# Patient Record
Sex: MALE | Race: WHITE | Employment: FULL TIME | URBAN - METROPOLITAN AREA
[De-identification: names, ages, dates, MRNs, and addresses within clinical notes are randomized per-mention and may not be internally consistent; named-entity substitution may affect disease eponyms.]

---

## 2021-03-15 ENCOUNTER — OFFICE VISIT (OUTPATIENT)
Dept: ORTHOPEDIC SURGERY | Age: 31
End: 2021-03-15
Payer: COMMERCIAL

## 2021-03-15 VITALS
SYSTOLIC BLOOD PRESSURE: 132 MMHG | DIASTOLIC BLOOD PRESSURE: 84 MMHG | HEART RATE: 98 BPM | OXYGEN SATURATION: 98 % | HEIGHT: 74 IN | BODY MASS INDEX: 23.61 KG/M2 | WEIGHT: 184 LBS | TEMPERATURE: 98.1 F

## 2021-03-15 DIAGNOSIS — M54.41 CHRONIC BILATERAL LOW BACK PAIN WITH BILATERAL SCIATICA: Primary | ICD-10-CM

## 2021-03-15 DIAGNOSIS — M54.42 CHRONIC BILATERAL LOW BACK PAIN WITH BILATERAL SCIATICA: Primary | ICD-10-CM

## 2021-03-15 DIAGNOSIS — G89.29 CHRONIC BILATERAL LOW BACK PAIN WITH BILATERAL SCIATICA: ICD-10-CM

## 2021-03-15 DIAGNOSIS — M62.561 ATROPHY OF MUSCLE OF RIGHT LOWER LEG: ICD-10-CM

## 2021-03-15 DIAGNOSIS — M21.372 FOOT DROP, BILATERAL: ICD-10-CM

## 2021-03-15 DIAGNOSIS — M54.42 CHRONIC BILATERAL LOW BACK PAIN WITH BILATERAL SCIATICA: ICD-10-CM

## 2021-03-15 DIAGNOSIS — G89.29 CHRONIC BILATERAL LOW BACK PAIN WITH BILATERAL SCIATICA: Primary | ICD-10-CM

## 2021-03-15 DIAGNOSIS — M54.41 CHRONIC BILATERAL LOW BACK PAIN WITH BILATERAL SCIATICA: ICD-10-CM

## 2021-03-15 DIAGNOSIS — M21.371 FOOT DROP, BILATERAL: ICD-10-CM

## 2021-03-15 DIAGNOSIS — R29.898 BILATERAL LEG WEAKNESS: ICD-10-CM

## 2021-03-15 PROCEDURE — 99203 OFFICE O/P NEW LOW 30 MIN: CPT | Performed by: NURSE PRACTITIONER

## 2021-03-15 PROCEDURE — 72100 X-RAY EXAM L-S SPINE 2/3 VWS: CPT | Performed by: NURSE PRACTITIONER

## 2021-03-15 RX ORDER — CHOLECALCIFEROL (VITAMIN D3) 125 MCG
660 CAPSULE ORAL DAILY
COMMUNITY

## 2021-03-15 NOTE — LETTER
3/15/2021 3:34 PM 
 
Mr. Jung Murphy 57 [address] [address] . Etta Isaac was seen in our office today for his spine condition. He has been unable to work since March 10, 2021 and will be unable to return to work until after his follow up appointment. The follow up 
 
appointment will be determined once he has had his lumbar MRI. Sincerely, Chasity Corrales NP

## 2021-03-15 NOTE — PROGRESS NOTES
Chief complaint   Chief Complaint   Patient presents with    Back Pain    Leg Pain     has had N/T in R > L in time       History of Present Illness:  Pearl Guthrie is a  35 y.o.  male comes in today on self-referral as a new patient. He states that over a year ago he threw out his back by throwing a box and immediately had right leg pins-and-needles that lasted for 5 to 6 months. He went to a chiropractor who tried to do some adjustments and did some x-rays but he does not really know what the x-ray said. He then went to a physician who put him in physical therapy but his pain was so bad he could only tolerate the one session. He has been to the ER on July 10 of 2019 for back pain so I think this muscle started more along that timeframe. He states the pain is a sharp shooting pain that goes into his bilateral legs down to his feet. It is in his bilateral buttocks and travels posterior lateral down to his feet. He states his legs are pretty much totally numb. And he has noticed over the last several months he now has atrophy in his right leg. He is also had a 70 pound weight loss over the last couple years which is unexplained. He states the right foot drop started 6 to 7 months ago and then the left one followed after that. He works as a  at the [employer]. He dips snuff using 1 can/day. He does smoke marijuana at bedtime due to his pain. Rates his pain is a 6 out of 10. He states he has difficulty walking because both his feet will slap as he walks. He has to concentrate very hard to even get his feet to lift up enough to walk. His pain is increased with sitting, standing, walking, changing positions, lifting and bending. It is decreased with laying. He states he can only lay on his left side because if he lays on the right the pain is unbearable. He denies fever. He states he has a hard time initiating urine and bowel movements.   He states once he is able to get going that it does come out okay. Patient reports that he has not been able to work since last Wednesday due to his pain. Past Medical History: Bipolar, ADHD    Past Surgical History: Right hand surgery x2      Physical Exam: The patient is a 25-year-old male well-developed well-nourished who is alert and oriented with a normal mood and affect. He is 6 foot 2 and weighs 184 pounds. He has a full weightbearing off balance gait with flat feet. He has 1 out of 5 strength of his bilateral EHL and tib ants. He has 3-4 out of 5 strength of his bilateral hamstrings and quads. He is unable to get up on his toes or his heels bilaterally he has normal reflexes at the patella and 0 reflexes at the Achilles. Negative clonus. His right calf muscle measures 34-1/2 cm. The left calf muscle measures 37-1/2 cm. He has pain with hyper tension of the lumbar spine. Assessment and Plan: This is a patient has back pain with bilateral leg pain and neurological deficits. We did a lumbar AP and lateral x-ray. We will get a urgent MRI done of his lumbar spine. He will follow-up with Dr. Josie Freeman. I will put him out of work until he is seen for the MRI follow-up.         Review of systems:    Past Medical History:   Diagnosis Date    Bipolar disorder [facility])      Past Surgical History:   Procedure Laterality Date    HX OTHER SURGICAL      right hand thumb and hand     Social History     Socioeconomic History    Marital status:      Spouse name: Not on file    Number of children: Not on file    Years of education: Not on file    Highest education level: Not on file   Occupational History    Not on file   Social Needs    Financial resource strain: Not on file    Food insecurity     Worry: Not on file     Inability: Not on file    Transportation needs     Medical: Not on file     Non-medical: Not on file   Tobacco Use    Smoking status: Former Smoker     Packs/day: 0.50     Types: Cigarettes    Smokeless tobacco: Current User     Types: Chew   Substance and Sexual Activity    Alcohol use: Not Currently    Drug use: Yes     Types: Marijuana     Comment: daily    Sexual activity: Not Currently   Lifestyle    Physical activity     Days per week: Not on file     Minutes per session: Not on file    Stress: Not on file   Relationships    Social connections     Talks on phone: Not on file     Gets together: Not on file     Attends [employer] service: Not on file     Active member of club or organization: Not on file     Attends meetings of clubs or organizations: Not on file     Relationship status: Not on file    Intimate partner violence     Fear of current or ex partner: Not on file     Emotionally abused: Not on file     Physically abused: Not on file     Forced sexual activity: Not on file   Other Topics Concern    Not on file   Social History Narrative    Not on file     Family History   Problem Relation Age of Onset    Heart Disease Maternal Grandfather        Physical Exam:  Visit Vitals  /84 (BP 1 Location: Left upper arm, BP Patient Position: Sitting, BP Cuff Size: Adult long)   Pulse 98   Temp 98.1 °F (36.7 °C) (Temporal)   Ht 6' 2\" (1.88 m)   Wt 184 lb (83.5 kg)   SpO2 98%   BMI 23.62 kg/m²     Pain Scale: 6/10    LPMP has been . reviewed and is appropriate        Diagnoses and all orders for this visit:    1. Chronic bilateral low back pain with bilateral sciatica  -     AMB POC XRAY, SPINE, LUMBOSACRAL; 2 O  -     MRI LUMB SPINE WO CONT; Future    2. Bilateral leg weakness  -     MRI LUMB SPINE WO CONT; Future    3. Foot drop, bilateral  -     MRI LUMB SPINE WO CONT; Future    4. Atrophy of muscle of right lower leg  -     MRI LUMB SPINE WO CONT; Future            Follow-up and Dispositions    · Return in about 1 week (around 3/22/2021) for with Dr Cristina Diego for MRI f/u.              We have informed Bert You to notify us for immediate appointment if he has any worsening neurogical symptoms or if an emergency situation presents, then call [phone]

## 2021-03-22 ENCOUNTER — OFFICE VISIT (OUTPATIENT)
Dept: ORTHOPEDIC SURGERY | Age: 31
End: 2021-03-22
Payer: COMMERCIAL

## 2021-03-22 VITALS
TEMPERATURE: 98.5 F | OXYGEN SATURATION: 99 % | SYSTOLIC BLOOD PRESSURE: 143 MMHG | DIASTOLIC BLOOD PRESSURE: 88 MMHG | WEIGHT: 185 LBS | HEART RATE: 111 BPM | BODY MASS INDEX: 23.75 KG/M2

## 2021-03-22 DIAGNOSIS — M54.42 CHRONIC BILATERAL LOW BACK PAIN WITH BILATERAL SCIATICA: ICD-10-CM

## 2021-03-22 DIAGNOSIS — G89.29 CHRONIC BILATERAL LOW BACK PAIN WITH BILATERAL SCIATICA: ICD-10-CM

## 2021-03-22 DIAGNOSIS — M25.552 BILATERAL HIP PAIN: ICD-10-CM

## 2021-03-22 DIAGNOSIS — M54.16 LEFT LUMBAR RADICULOPATHY: ICD-10-CM

## 2021-03-22 DIAGNOSIS — M54.41 CHRONIC BILATERAL LOW BACK PAIN WITH BILATERAL SCIATICA: ICD-10-CM

## 2021-03-22 DIAGNOSIS — M25.551 BILATERAL HIP PAIN: ICD-10-CM

## 2021-03-22 DIAGNOSIS — M48.061 SPINAL STENOSIS AT L4-L5 LEVEL: ICD-10-CM

## 2021-03-22 DIAGNOSIS — M21.371 FOOT DROP, BILATERAL: Primary | ICD-10-CM

## 2021-03-22 DIAGNOSIS — M21.372 FOOT DROP, BILATERAL: Primary | ICD-10-CM

## 2021-03-22 PROCEDURE — 99214 OFFICE O/P EST MOD 30 MIN: CPT | Performed by: PHYSICAL MEDICINE & REHABILITATION

## 2021-03-22 NOTE — PROGRESS NOTES
[address]  [address], [address]  [address]  Phone: [phone]  Fax: [phone]      Patient: Max Alvarez                                                                              MRN: [id number]        YOB: 1990          AGE: 35 y.o. PCP: None  Date:  03/22/21    Reason for Consultation: Back Pain      HPI:  Max Alvarez is a 35 y.o. male  who presents with back pain. Pain has been present for many years and he has had several episodes for severe pain. About 1 year ago when lifting and throwing a box he felt his entire right leg go numb and developed weakness in his right foot. He continued to work despite pain in his low back and weakness in his right leg. He saw a chiropractor at the time and did some adjustments. He also tried one sessions of PT which he found very painful and could not tolerate. About 2 weeks ago while walking he felt numbness and tingling down his left leg and developed a left foot drop. He was seen last week by Jeffrey Christianson NP who got an MRI of his lumbar spine, and he is here to review findings. MRI demonstrates L4/5 left paracentral disc bulge with extruded material severe central and left lateral recess stenosis. L5/S1 right paracentral disc bulge severe b/l foraminal stenosis, L2/3 moderate central and severe left latarl recess stenosis    . Neurologic symptoms:++ numbness, tingling, ++weakness. Trouble initiating bowel movement. No incontinence bowel or bladder changes. No recent falls but unsteady gait    Location: The pain is located in the low back/hips bilateral legs  Radiation: The pain does radiate bilateral legs. Pain Score: Currently: 7/10   Quality: Pain is of a Stabbing, Tingling and Numbness quality. Aggravating: Pain is exacerbated by working, standing  Alleviating:  The pain is alleviated by lying down on right side pillow between legs    Prior Treatments: chiropractic treatments 1 year ago  Previous Medications:  Aleve  Current Medications:   Previous work-up has included:     Past Medical History:   Past Medical History:   Diagnosis Date    Bipolar disorder ([address]       Past Surgical History:   Past Surgical History:   Procedure Laterality Date    HX OTHER SURGICAL      right hand thumb and hand      SocHx:   Social History     Tobacco Use    Smoking status: Former Smoker     Packs/day: 0.50     Types: Cigarettes    Smokeless tobacco: Current User     Types: Chew   Substance Use Topics    Alcohol use: Not Currently      FamHx:? Family History   Problem Relation Age of Onset    Heart Disease Maternal Grandfather        Current Medications:    Current Outpatient Medications   Medication Sig Dispense Refill    naproxen sodium (Aleve) 220 mg cap Take 660 mg by mouth daily.  Lisdexamfetamine (Vyvanse) 70 mg cap Take 70 mg by mouth daily.  ARIPiprazole (ABILIFY) 5 mg tablet Take 5 mg by mouth daily. Allergies:  No Known Allergies     Review of Systems:   Gen:    Denied fevers, chills, malaise, fatigue, weight changes   Resp: Denied shortness of breath, cough, wheezing   CVS: Denied chest pain, palpitations   : Denied urinary urgency, frequency, incontinence   GI: Denied nausea, vomiting, constipation, diarrhea   Skin: Denied rashes, wounds   Psych: Denied anxiety, depression   Vasc: Denied claudication, ulcers   Hem: Denied easy bruising/bleeding   MSK: See HPI   Neuro: See HPI         Physical Exam     Vital Signs:   Visit Vitals  BP (!) 143/88 (BP 1 Location: Left arm, BP Patient Position: Sitting)   Pulse (!) 111   Temp 98.5 °F (36.9 °C) (Skin)   Wt 185 lb (83.9 kg)   SpO2 99%   BMI 23.75 kg/m²      General: ??????? Well nourished and well developed male without any acute distress   Psychiatric: ?  Alert and oriented x 3 with normal mood    HEENT: ????????   Atraumatic   Respiratory:   Breathing non-labored and non dyspneic   CV: ???????????????? Peripheral pulses intact, no peripheral edema   Skin: ????????????? No rashes       Neurologic: ?? Sensation: normal and grossly intact thebilateral, lower extremity(s) except diminished b/l lower extremities  Strength: 5/5 in the bilateral, lower extremity(s) except bilateral hip flexion 4/5, knee extension 4/5, right DF 2/5, EHL 2/5 , PF unable to calf raise ,  Left DF 3/5, EHL3/5, PF 3/5   Full strength and sensation b/l Upper extremities  Reflexes: reveals 2+ symmetric DTRs throughout except right plantar reflex absent  Gait: abnormal- external rotation right hip, circumducts  Upper tract signs: Babinski down going, Ching's negative ?, no clonus  Atrophy left leg     Lumbar spine ROM- pain with flexion and extension, worse with extension  Pain with hip ROM b/l- worse on the right  + right log roll, +b /l FADIR  + seated slump and straight leg b/l    Medical Decision Making:    Images:   MRI lumbar spine  MRI demonstrates L4/5 left paracentral disc bulge with extruded material severe central and left lateral recess stenosis. L5/S1 right paracentral disc bulge severe b/l foraminal stenosis, L2/3 moderate central and severe left lateral recess stenosis       Assessment:   1. Acute Left L5 radiculopathy with foot drop  2. Severe spinal stenosis L4/5  3. Chronic right L5/S1 radiculopathy- with weakness plantar flexion and dorisflexion  Plan:      -Referral to Dr. Margret Roberts- for surgical evaluation given new left foot weakness and evidence of severe central stenosis and left lateral recess stenosis on MRI.   He does also have chronic right foot weakness and explained this strength unfortunately may not improve.   -Consider EMG  - x-ray b/l hips-decreased ROM on exam and pain with internal rotation  -Consider right AFO  _Note provided to hold from work  _F/u if needed after evaluation with Dr. Millicent Couch [facility]

## 2021-03-22 NOTE — LETTER
3/22/2021 9:22 AM 
 
Mr. Jung Chao [facility] [address] [address] To whom it may concern, Jung Iyer is currently under my care. I recommend that he rest from work over the next two weeks while he undergoes work up and further evaluation of his condition. Please contact me with any questions. Sincerely, Fabian Ruiz MD

## 2021-04-05 PROBLEM — R59.1 LYMPHADENOPATHY: Status: ACTIVE | Noted: 2021-04-05

## 2021-04-05 PROBLEM — J98.59 MEDIASTINAL MASS: Status: ACTIVE | Noted: 2021-04-05

## 2021-04-06 ENCOUNTER — OFFICE VISIT (OUTPATIENT)
Age: 31
End: 2021-04-06
Payer: COMMERCIAL

## 2021-04-06 ENCOUNTER — NURSE NAVIGATOR (OUTPATIENT)
Dept: OTHER | Age: 31
End: 2021-04-06

## 2021-04-06 ENCOUNTER — HOSPITAL ENCOUNTER (OUTPATIENT)
Dept: INFUSION THERAPY | Age: 31
Discharge: HOME | End: 2021-04-06
Payer: COMMERCIAL

## 2021-04-06 ENCOUNTER — TRANSCRIBE ORDER (OUTPATIENT)
Dept: SCHEDULING | Age: 31
End: 2021-04-06

## 2021-04-06 VITALS
TEMPERATURE: 97 F | OXYGEN SATURATION: 96 % | DIASTOLIC BLOOD PRESSURE: 83 MMHG | RESPIRATION RATE: 16 BRPM | HEART RATE: 97 BPM | SYSTOLIC BLOOD PRESSURE: 126 MMHG

## 2021-04-06 VITALS
HEART RATE: 97 BPM | SYSTOLIC BLOOD PRESSURE: 126 MMHG | WEIGHT: 188 LBS | BODY MASS INDEX: 24.13 KG/M2 | TEMPERATURE: 97 F | HEIGHT: 74 IN | OXYGEN SATURATION: 96 % | DIASTOLIC BLOOD PRESSURE: 83 MMHG | RESPIRATION RATE: 16 BRPM

## 2021-04-06 DIAGNOSIS — J98.59 DISEASE OF MEDIASTINUM: Primary | ICD-10-CM

## 2021-04-06 DIAGNOSIS — J98.59 MEDIASTINAL MASS: Primary | ICD-10-CM

## 2021-04-06 DIAGNOSIS — J98.59 MEDIASTINAL MASS: ICD-10-CM

## 2021-04-06 DIAGNOSIS — R59.1 LYMPHADENOPATHY: ICD-10-CM

## 2021-04-06 LAB
ALBUMIN SERPL-MCNC: 4.1 G/DL (ref 3.4–5)
ALBUMIN/GLOB SERPL: 1.2 {RATIO} (ref 0.8–1.7)
ALP SERPL-CCNC: 97 U/L (ref 45–117)
ALT SERPL-CCNC: 38 U/L (ref 16–61)
ANION GAP SERPL CALC-SCNC: 2 MMOL/L (ref 3–18)
AST SERPL-CCNC: 20 U/L (ref 10–38)
BASO+EOS+MONOS # BLD AUTO: 0.7 K/UL (ref 0–2.3)
BASO+EOS+MONOS NFR BLD AUTO: 11 % (ref 0.1–17)
BILIRUB SERPL-MCNC: 0.3 MG/DL (ref 0.2–1)
BUN SERPL-MCNC: 13 MG/DL (ref 7–18)
BUN/CREAT SERPL: 13 (ref 12–20)
CALCIUM SERPL-MCNC: 8.8 MG/DL (ref 8.5–10.1)
CHLORIDE SERPL-SCNC: 107 MMOL/L (ref 100–111)
CO2 SERPL-SCNC: 30 MMOL/L (ref 21–32)
CREAT SERPL-MCNC: 1.04 MG/DL (ref 0.6–1.3)
CRP SERPL-MCNC: 2 MG/DL (ref 0–0.3)
DIFFERENTIAL METHOD BLD: NORMAL
ERYTHROCYTE [DISTWIDTH] IN BLOOD BY AUTOMATED COUNT: 12.9 % (ref 11.5–14.5)
ERYTHROCYTE [SEDIMENTATION RATE] IN BLOOD: 14 MM/HR (ref 0–20)
GLOBULIN SER CALC-MCNC: 3.4 G/DL (ref 2–4)
GLUCOSE SERPL-MCNC: 75 MG/DL (ref 74–99)
HAV IGM SER QL: NEGATIVE
HBV CORE IGM SER QL: NEGATIVE
HBV SURFACE AG SER QL: <0.1 INDEX
HBV SURFACE AG SER QL: NEGATIVE
HCT VFR BLD AUTO: 37.5 % (ref 36–48)
HCV AB SER IA-ACNC: 0.05 INDEX
HCV AB SERPL QL IA: NEGATIVE
HCV COMMENT,HCGAC: NORMAL
HGB BLD-MCNC: 12.9 G/DL (ref 12–16)
LDH SERPL L TO P-CCNC: 214 U/L (ref 81–234)
LYMPHOCYTES # BLD: 1.4 K/UL (ref 1.1–5.9)
LYMPHOCYTES NFR BLD: 24 % (ref 14–44)
MCH RBC QN AUTO: 31.2 PG (ref 25–35)
MCHC RBC AUTO-ENTMCNC: 34.4 G/DL (ref 31–37)
MCV RBC AUTO: 90.8 FL (ref 78–102)
NEUTS SEG # BLD: 4 K/UL (ref 1.8–9.5)
NEUTS SEG NFR BLD: 65 % (ref 40–70)
PLATELET # BLD AUTO: 176 K/UL (ref 140–440)
POTASSIUM SERPL-SCNC: 4.4 MMOL/L (ref 3.5–5.5)
PROT SERPL-MCNC: 7.5 G/DL (ref 6.4–8.2)
RBC # BLD AUTO: 4.13 M/UL (ref 4.1–5.1)
SODIUM SERPL-SCNC: 139 MMOL/L (ref 136–145)
SP1: NORMAL
SP2: NORMAL
SP3: NORMAL
URATE SERPL-MCNC: 6.2 MG/DL (ref 2.6–7.2)
WBC # BLD AUTO: 6.1 K/UL (ref 4.5–13)

## 2021-04-06 PROCEDURE — 83615 LACTATE (LD) (LDH) ENZYME: CPT

## 2021-04-06 PROCEDURE — 80074 ACUTE HEPATITIS PANEL: CPT

## 2021-04-06 PROCEDURE — 85025 COMPLETE CBC W/AUTO DIFF WBC: CPT

## 2021-04-06 PROCEDURE — 85652 RBC SED RATE AUTOMATED: CPT

## 2021-04-06 PROCEDURE — 87389 HIV-1 AG W/HIV-1&-2 AB AG IA: CPT

## 2021-04-06 PROCEDURE — 82164 ANGIOTENSIN I ENZYME TEST: CPT

## 2021-04-06 PROCEDURE — 84550 ASSAY OF BLOOD/URIC ACID: CPT

## 2021-04-06 PROCEDURE — 80053 COMPREHEN METABOLIC PANEL: CPT

## 2021-04-06 PROCEDURE — 86140 C-REACTIVE PROTEIN: CPT

## 2021-04-06 PROCEDURE — 86704 HEP B CORE ANTIBODY TOTAL: CPT

## 2021-04-06 PROCEDURE — 36415 COLL VENOUS BLD VENIPUNCTURE: CPT

## 2021-04-06 PROCEDURE — 86665 EPSTEIN-BARR CAPSID VCA: CPT

## 2021-04-06 PROCEDURE — 99204 OFFICE O/P NEW MOD 45 MIN: CPT | Performed by: INTERNAL MEDICINE

## 2021-04-06 PROCEDURE — 86038 ANTINUCLEAR ANTIBODIES: CPT

## 2021-04-06 NOTE — PROGRESS NOTES
[facility] Progress Note    Date: 2021    Name: Luna Rubinstein    MRN: [id number]         : 1990    Peripheral Lab Draw      Mr. Mia Arizmendi to [facility], ambulatory at [phone] accompanied by self. Pt was assessed and education was provided. Mr. Alpa Molina vitals were reviewed and patient was observed for 5 minutes prior to treatment. Visit Vitals  /83   Pulse 97   Temp 97 °F (36.1 °C) (Oral)   Resp 16   SpO2 96%     Recent Results (from the past 12 hour(s))   METABOLIC PANEL, COMPREHENSIVE    Collection Time: 21 11:02 AM   Result Value Ref Range    Sodium 139 136 - 145 mmol/L    Potassium 4.4 3.5 - 5.5 mmol/L    Chloride 107 100 - 111 mmol/L    CO2 30 21 - 32 mmol/L    Anion gap 2 (L) 3.0 - 18 mmol/L    Glucose 75 74 - 99 mg/dL    BUN 13 7.0 - 18 MG/DL    Creatinine 1.04 0.6 - 1.3 MG/DL    BUN/Creatinine ratio 13 12 - 20      GFR est AA >60 >60 ml/min/1.73m2    GFR est non-AA >60 >60 ml/min/1.73m2    Calcium 8.8 8.5 - 10.1 MG/DL    Bilirubin, total 0.3 0.2 - 1.0 MG/DL    ALT (SGPT) 38 16 - 61 U/L    AST (SGOT) 20 10 - 38 U/L    Alk. phosphatase 97 45 - 117 U/L    Protein, total 7.5 6.4 - 8.2 g/dL    Albumin 4.1 3.4 - 5.0 g/dL    Globulin 3.4 2.0 - 4.0 g/dL    A-G Ratio 1.2 0.8 - 1.7     LD    Collection Time: 21 11:02 AM   Result Value Ref Range     81 - 234 U/L   URIC ACID    Collection Time: 21 11:02 AM   Result Value Ref Range    Uric acid 6.2 2.6 - 7.2 MG/DL   HEPATITIS PANEL, ACUTE    Collection Time: 21 11:02 AM   Result Value Ref Range    Hepatitis A, IgM PENDING     __          Hepatitis B surface Ag <0.10 <1.00 Index    Hep B surface Ag Interp. Negative NEG      __          Hepatitis B core, IgM PENDING     __          Hepatitis C virus Ab PENDING Index    Hep C virus Ab Interp.  PENDING     Hep C  virus Ab comment         C REACTIVE PROTEIN, QT    Collection Time: 21 11:02 AM   Result Value Ref Range    C-Reactive protein 2.0 (H) 0 - 0.3 mg/dL   CBC WITH 3 PART DIFF    Collection Time: 04/06/21 11:02 AM   Result Value Ref Range    WBC 6.1 4.5 - 13.0 K/uL    RBC 4.13 4.10 - 5.10 M/uL    HGB 12.9 12.0 - 16.0 g/dL    HCT 37.5 36 - 48 %    MCV 90.8 78 - 102 FL    MCH 31.2 25.0 - 35.0 PG    MCHC 34.4 31 - 37 g/dL    RDW 12.9 11.5 - 14.5 %    PLATELET 049 550 - 297 K/uL    NEUTROPHILS 65 40 - 70 %    MIXED CELLS 11 0.1 - 17 %    LYMPHOCYTES 24 14 - 44 %    ABS. NEUTROPHILS 4.0 1.8 - 9.5 K/UL    ABS. MIXED CELLS 0.7 0.0 - 2.3 K/uL    ABS. LYMPHOCYTES 1.4 1.1 - 5.9 K/UL    DF AUTOMATED         Blood obtained peripherally from left arm x 1 attempt with butterfly needle and sent to lab for Cbc w/diff, Cmp, ,Sed rate, C reactive protein, Uric acid, Hiv, LD, Peripheral smear, Char Barr virus panel, Antinuclear ab, Hepatitis B core ab, Hepatitis panel, and Angiotensin ab per written orders. No bleeding or hematoma noted at site. Gauze and coban applied. Mr. Vince Holter tolerated the phlebotomy, and had no complaints. Patient armband removed and shredded. Mr. Vince Holter was discharged from [place] [address] in stable condition at 1102.      Shaun [facility] Phlebotomist PCT  April 6, 2021  2:39 PM

## 2021-04-06 NOTE — PROGRESS NOTES
[facility]  [address] [place] [address], [address]  [place], [facility]  Office Phone: [phone]  Fax: [phone]      Reason for visit:  New Patient      HPI:   Chetan Lala is a 35 y.o.  male used to drink alcohol heavily but quit, cigarette smoking for one year, chronic low back pain and 30 lbs weight loss in past year, who I was asked to see in consultation at the request of Dr. Nilsa Severe for evaluation for abnormal CT abdomen and pelvis. CT CAP with contrast on 3/31/21 showed mediastinal, hilar and axillary lymphadenopathy as well as anterior mediastinal mass  I saw and examined patient today. He reports easy fatigue, weight loss, and occasional night sweats. He denies any fever, chills, NV or abdominal pain. No bleeding. He also reports 6/10 bilateral hip pain. Occasional drenching night sweats, has loss of appetite, All other point of view of system have been reviewed and were negative. ECOG PS=0. Independent with ADLs and IADLs      DX   Encounter Diagnoses   Name Primary?     Mediastinal mass Yes    Lymphadenopathy         STAGE:      ONCOLOGY HISTORY:  3/31/21: CT CAP with contrast showed multiple lymphadenopathy   4/6/21: First medical oncology visit        Past Medical History:   Diagnosis Date    Bipolar disorder [facility])      Past Surgical History:   Procedure Laterality Date    HX OTHER SURGICAL      right hand thumb and hand     Social History     Socioeconomic History    Marital status:      Spouse name: Not on file    Number of children: Not on file    Years of education: Not on file    Highest education level: Not on file   Tobacco Use    Smoking status: Former Smoker     Packs/day: 0.50     Types: Cigarettes    Smokeless tobacco: Current User     Types: Chew   Substance and Sexual Activity    Alcohol use: Not Currently    Drug use: Yes     Types: Marijuana     Comment: daily    Sexual activity: Not Currently     Family History Problem Relation Age of Onset    Heart Disease Maternal Grandfather        Current Outpatient Medications   Medication Sig Dispense Refill    naproxen sodium (Aleve) 220 mg cap Take 660 mg by mouth daily.  Lisdexamfetamine (Vyvanse) 70 mg cap Take 70 mg by mouth daily.  ARIPiprazole (ABILIFY) 5 mg tablet Take 5 mg by mouth daily. No Known Allergies    Review of Systems  As per HPI    Objective:  Physical Exam:  Visit Vitals  /83   Pulse 97   Temp 97 °F (36.1 °C) (Oral)   Resp 16   Ht 6' 2\" (1.88 m)   Wt 85.3 kg (188 lb)   SpO2 96%   BMI 24.14 kg/m²       General:  Alert, cooperative, no distress, appears stated age. Head:  Normocephalic, without obvious abnormality, atraumatic. Eyes:  Conjunctivae/corneas clear. PERRL, EOMs intact. Throat: Lips, mucosa, and tongue normal.    Neck: Supple, symmetrical, trachea midline, no adenopathy, thyroid: no enlargement/tenderness/nodules   Back:   Symmetric, no curvature. ROM normal. No CVA tenderness. Lungs:   Clear to auscultation bilaterally. Chest wall:  No tenderness or deformity. Heart:  Regular rate and rhythm, S1, S2 normal, no murmur, click, rub or gallop. Abdomen:   Soft, non-tender. Bowel sounds normal. No masses,  No organomegaly. Extremities: Extremities normal, atraumatic, no cyanosis or edema. Skin: Skin color, texture, turgor normal. No rashes or lesions. Lymph nodes: Cervical, supraclavicular, and axillary nodes normal.   Neurologic: CNII-XII intact. Diagnostic Imaging     No results found for this or any previous visit.     Lab Results  Lab Results   Component Value Date/Time    WBC 7.5 03/05/2020 06:10 PM    HGB 14.9 03/05/2020 06:10 PM    HCT 41.4 03/05/2020 06:10 PM    PLATELET 553 52/81/7061 06:10 PM    MCV 90.2 03/05/2020 06:10 PM       Lab Results   Component Value Date/Time    Sodium 139 03/05/2020 06:10 PM    Potassium 3.5 03/05/2020 06:10 PM    Chloride 107 03/05/2020 06:10 PM    CO2 23 03/05/2020 06:10 PM    Anion gap 9 03/05/2020 06:10 PM    Glucose 82 03/05/2020 06:10 PM    BUN 16 03/05/2020 06:10 PM    Creatinine 1.0 03/05/2020 06:10 PM    GFR est AA >60 03/05/2020 06:10 PM    GFR est non-AA >60 03/05/2020 06:10 PM    Calcium 9.0 03/05/2020 06:10 PM    Alk. phosphatase 71 08/15/2019 07:09 PM    Protein, total 7.2 08/15/2019 07:09 PM    Albumin 4.2 08/15/2019 07:09 PM    ALT (SGPT) 28 08/15/2019 07:09 PM     F/U with PCP for health maintenance  Assessment/Plan:  35 y.o. male with    1. Mediastinal mass  I had a long discussion with patient regarding his lymphadenopathies. I told him that this could be reactive versus due to malignancy. I told him that this could be from malignancy, infectious process or rheumatologic diseases. Plan is to check a PET/CT then if positive refer him for biopsy.  - CBC WITH AUTOMATED DIFF; Future  - METABOLIC PANEL, COMPREHENSIVE; Future  - PERIPHERAL SMEAR; Future  - PET/CT TUMOR IMAGE SKULL THIGH W (INI); Future  - LD; Future  - URIC ACID; Future  - ACE   - NEISHA    2. Lymphadenopathy  As above. - CBC WITH AUTOMATED DIFF; Future  - METABOLIC PANEL, COMPREHENSIVE; Future  - PERIPHERAL SMEAR; Future  - PET/CT TUMOR IMAGE SKULL THIGH W (INI); Future  - LD; Future  - URIC ACID; Future  - HIV 1/2 AG/AB, 4TH GENERATION,W RFLX CONFIRM; Future  - ROMAN BARR VIRUS AB PANEL; Future  - HEPATITIS PANEL, ACUTE; Future  - HEPATITIS B CORE AB, TOTAL; Future    3. Weight loss: 30-40Lbs wt loss in 2-3 years    4. Tobacco abuse: Tobacco cessation counseling done.     Return in 10 days

## 2021-04-07 LAB
ACE SERPL-CCNC: 116 U/L (ref 14–82)
EBV EA IGG SER-ACNC: ABNORMAL U/ML
EBV NA IGG SER-ACNC: >600 U/ML (ref 0–17.9)
EBV VCA IGG SER-ACNC: >600 U/ML (ref 0–17.9)
EBV VCA IGM SER-ACNC: <36 U/ML (ref 0–35.9)
HBV CORE AB SERPL QL IA: NEGATIVE
HIV 1+2 AB+HIV1 P24 AG SERPL QL IA: NONREACTIVE
HIV12 RESULT COMMENT, HHIVC: NORMAL
INTERPRETATION, 169995: ABNORMAL

## 2021-04-08 LAB — ANA TITR SER IF: NEGATIVE {TITER}

## 2021-04-22 ENCOUNTER — HOSPITAL ENCOUNTER (OUTPATIENT)
Dept: PET IMAGING | Age: 31
Discharge: HOME | End: 2021-04-22
Attending: INTERNAL MEDICINE
Payer: MEDICAID

## 2021-04-22 DIAGNOSIS — R59.1 LYMPHADENOPATHY: ICD-10-CM

## 2021-04-22 DIAGNOSIS — J98.59 DISEASE OF MEDIASTINUM: ICD-10-CM

## 2021-04-22 PROCEDURE — 78815 PET IMAGE W/CT SKULL-THIGH: CPT

## 2021-04-27 ENCOUNTER — DOCUMENTATION ONLY (OUTPATIENT)
Age: 31
End: 2021-04-27

## 2021-04-27 NOTE — NURSE NAVIGATOR
Saw pt in clinic with Dr. Sherrill Sullivan for initial visit, PET Scan ordered. If positive will have bx. Labs today, smoking cessation provided. RTC in 10 days, all questions answered.

## 2021-04-30 ENCOUNTER — TELEPHONE (OUTPATIENT)
Age: 31
End: 2021-04-30

## 2021-04-30 NOTE — TELEPHONE ENCOUNTER
Patients mother called stating that the patient woke up today extremely fatigued, pressure in head and that it hurts behind his ears.  Other symptoms stated were as follows: Patient is feeling intense pain, Back is hurting; back pain is dull until patient stretches then it feels like stabbing pain and he gets lightheaded, legs are hurting,  hard to breath, light headed   Patients number is [phone]

## 2021-05-06 ENCOUNTER — OFFICE VISIT (OUTPATIENT)
Age: 31
End: 2021-05-06
Payer: MEDICAID

## 2021-05-06 VITALS
WEIGHT: 195 LBS | HEIGHT: 74 IN | OXYGEN SATURATION: 99 % | BODY MASS INDEX: 25.03 KG/M2 | SYSTOLIC BLOOD PRESSURE: 134 MMHG | DIASTOLIC BLOOD PRESSURE: 81 MMHG | HEART RATE: 88 BPM

## 2021-05-06 DIAGNOSIS — R59.1 LYMPHADENOPATHY: Primary | ICD-10-CM

## 2021-05-06 PROCEDURE — 99214 OFFICE O/P EST MOD 30 MIN: CPT | Performed by: INTERNAL MEDICINE

## 2021-05-06 NOTE — PROGRESS NOTES
[facility]  [address] [address], [address]  Gerard ose [facility]  Office Phone: [phone]  Fax: [phone]      Reason for visit:  Lymphadenopathy    HPI:   Milena Cazares is a 35 y.o.  male used to drink alcohol heavily but quit, cigarette smoking for one year, chronic low back pain and 30 lbs weight loss in past year, who I was asked to see in consultation at the request of Dr. Mary Lou Mcgregor for evaluation for abnormal CT abdomen and pelvis. CT CAP with contrast on 3/31/21 showed mediastinal, hilar and axillary lymphadenopathy as well as anterior mediastinal mass  I saw and examined patient today. He reports easy fatigue, weight loss, and occasional night sweats. He denies any fever, chills, NV or abdominal pain. No bleeding. He also reports 6/10 bilateral hip pain. Occasional drenching night sweats, has loss of appetite, All other point of view of system have been reviewed and were negative. ECOG PS=0. Independent with ADLs and IADLs      DX   No diagnosis found. ONCOLOGY HISTORY:  3/31/21: CT CAP with contrast showed multiple lymphadenopathy   4/6/21: First medical oncology visit  4/22/2026: PETCT  showed: \"No definite diagnostic demonstration of malignant metabolic activity. The nonspecific small soft tissue density described on recent chest CT in the anterior mediastinum demonstrates low-level FDG activity minimally above blood pool. Possibly thymic tissue, not specific, as discussed above and in prior report of chest CT. The few small to mildly prominent lymph nodes described on recent recent chest CT do not demonstrate FDG avidity greater than blood pool, nonspecific, possibly reactive/inflammatory. No other focal abnormal increased FDG accumulation suspicious for malignant metabolic activity. \"          Past Medical History:   Diagnosis Date    Bipolar disorder [facility])      Past Surgical History:   Procedure Laterality Date    HX OTHER SURGICAL right hand thumb and hand     Social History     Socioeconomic History    Marital status:      Spouse name: Not on file    Number of children: Not on file    Years of education: Not on file    Highest education level: Not on file   Tobacco Use    Smoking status: Current Every Day Smoker     Packs/day: 1.00     Years: 1.00     Pack years: 1.00     Types: Cigarettes    Smokeless tobacco: Current User     Types: Chew   Substance and Sexual Activity    Alcohol use: Not Currently     Comment: quit 1/14/21    Drug use: Yes     Types: Marijuana     Comment: daily    Sexual activity: Not Currently     Family History   Problem Relation Age of Onset    Heart Disease Maternal Grandfather     Hypertension Father     Cancer Maternal Grandmother        Current Outpatient Medications   Medication Sig Dispense Refill    naproxen sodium (Aleve) 220 mg cap Take 660 mg by mouth daily.  Lisdexamfetamine (Vyvanse) 70 mg cap Take 70 mg by mouth daily.  ARIPiprazole (ABILIFY) 5 mg tablet Take 5 mg by mouth daily. No Known Allergies    Review of Systems  As per HPI    Objective:  Physical Exam:  Visit Vitals  /81   Pulse 88   Ht 6' 2\" (1.88 m)   Wt 88.5 kg (195 lb)   SpO2 99%   BMI 25.04 kg/m²       General:  Alert, cooperative, no distress, appears stated age. Head:  Normocephalic, without obvious abnormality, atraumatic. Eyes:  Conjunctivae/corneas clear. PERRL, EOMs intact. Throat: Lips, mucosa, and tongue normal.    Neck: Supple, symmetrical, trachea midline, no adenopathy, thyroid: no enlargement/tenderness/nodules   Back:   Symmetric, no curvature. ROM normal. No CVA tenderness. Lungs:   Clear to auscultation bilaterally. Chest wall:  No tenderness or deformity. Heart:  Regular rate and rhythm, S1, S2 normal, no murmur, click, rub or gallop. Abdomen:   Soft, non-tender. Bowel sounds normal. No masses,  No organomegaly.    Extremities: Extremities normal, atraumatic, no cyanosis or edema. Skin: Skin color, texture, turgor normal. No rashes or lesions. Lymph nodes: Cervical, supraclavicular, and axillary nodes normal.   Neurologic: CNII-XII intact. Diagnostic Imaging     No results found for this or any previous visit. Lab Results  Lab Results   Component Value Date/Time    WBC 6.1 04/06/2021 11:02 AM    HGB 12.9 04/06/2021 11:02 AM    HCT 37.5 04/06/2021 11:02 AM    PLATELET 068 88/67/1543 11:02 AM    MCV 90.8 04/06/2021 11:02 AM       Lab Results   Component Value Date/Time    Sodium 139 04/06/2021 11:02 AM    Potassium 4.4 04/06/2021 11:02 AM    Chloride 107 04/06/2021 11:02 AM    CO2 30 04/06/2021 11:02 AM    Anion gap 2 (L) 04/06/2021 11:02 AM    Glucose 75 04/06/2021 11:02 AM    BUN 13 04/06/2021 11:02 AM    Creatinine 1.04 04/06/2021 11:02 AM    BUN/Creatinine ratio 13 04/06/2021 11:02 AM    GFR est AA >60 04/06/2021 11:02 AM    GFR est non-AA >60 04/06/2021 11:02 AM    Calcium 8.8 04/06/2021 11:02 AM    Alk. phosphatase 97 04/06/2021 11:02 AM    Protein, total 7.5 04/06/2021 11:02 AM    Albumin 4.1 04/06/2021 11:02 AM    Globulin 3.4 04/06/2021 11:02 AM    A-G Ratio 1.2 04/06/2021 11:02 AM    ALT (SGPT) 38 04/06/2021 11:02 AM     F/U with PCP for health maintenance  Assessment/Plan:  35 y.o. male with    1. Mediastinal mass  I had a long discussion with patient regarding his lymphadenopathies. I told him that this could be reactive versus due to malignancy. I told him that this could be from malignancy, infectious process or rheumatologic diseases. Plan is to check a PET/CT then if positive refer him for biopsy. Labs on 4/6/2021 showed WBC 6.1, H&H 12.9/37.5, platelet 026. Normal ESR, CRP is elevated at 2.0 (00 0.3) NEISHA is negative. ACE was elevated at 116 (1482) hepatitis panel is negative, EBV serology showed past infection. HIV serology was negative. LDH and uric acid normal.  BUN thirteen, creatinine 1.04. PET/CT done on 4/22/2026 showed:  \"No definite diagnostic demonstration of malignant metabolic activity. The nonspecific small soft tissue density described on recent chest CT in the anterior mediastinum demonstrates low-level FDG activity minimally above blood  pool. Possibly thymic tissue, not specific, as discussed above and in prior report of chest CT. The few small to mildly prominent lymph nodes described on recent recent chest CT do not demonstrate FDG avidity greater than blood pool, nonspecific, possibly reactive/inflammatory. No other focal abnormal increased FDG accumulation suspicious for malignant metabolic activity. \"    I told patient that the only real positive is high the lymph node under remnant of thymic tissue or the elevated CRP and angiotensin-converting enzyme. I told him that the differential diagnosis of this could include sarcoidosis, Gaucher's disease, cirrhosis, amyloidosis, diabetes, psoriasis etc.  *Recommend to refer patient to rheumatology  *Otherwise I think lymphadenopathy are too small with such a mild activity that biopsy would not help at this point. We will continue follow-up and recheck imaging in about 6 months or earlier if needed. 2. Lymphadenopathy  PET/CT as aforementioned showed small mildly prominent lymph nodes which do not demonstrate FDG avid to greater than blood pool, there are nonspecific and possibly reactive or inflammatory. 3. Weight loss: 30-40Lbs wt loss in 2-3 years    4. Tobacco abuse: Tobacco cessation counseling done. RTC in 6 months with repeat labs and CT chest abdomen pelvis.     Return in 10 days

## 2021-05-18 ENCOUNTER — TELEPHONE (OUTPATIENT)
Age: 31
End: 2021-05-18

## 2021-05-18 NOTE — TELEPHONE ENCOUNTER
Patient's wife called stating that the patient was suppose to be referred to a Rheumatologist however they have not received a call. I let Mrs. Hadley Castro know that one has been sent out and that the office where the referral was sent to has to call the patient.

## 2021-05-21 ENCOUNTER — TELEPHONE (OUTPATIENT)
Age: 31
End: 2021-05-21

## 2021-05-21 NOTE — TELEPHONE ENCOUNTER
Melanie from [address] [place] [address] was told to call our office to let us know that the patient has an appointment with them on July 30th at 9:15.

## 2021-10-18 ENCOUNTER — TELEPHONE (OUTPATIENT)
Age: 31
End: 2021-10-18

## 2021-11-02 ENCOUNTER — TELEPHONE (OUTPATIENT)
Age: 31
End: 2021-11-02

## 2021-11-02 NOTE — TELEPHONE ENCOUNTER
Left a message for patient to call office back in regards to his appointment on 11/11. Patient to have CT and labs done before appointment. Labs are scheduled, but CT is not.

## 2021-11-06 DIAGNOSIS — R59.1 LYMPHADENOPATHY: ICD-10-CM

## 2021-11-10 NOTE — TELEPHONE ENCOUNTER
Left a message for patient to call office back to reschedule appointment on 11/11. Patient needs labs and CT done. Appointment has been canceled.

## 2022-03-18 PROBLEM — R59.1 LYMPHADENOPATHY: Status: ACTIVE | Noted: 2021-04-05

## 2022-03-18 PROBLEM — J98.59 MEDIASTINAL MASS: Status: ACTIVE | Noted: 2021-04-05

## 2023-01-31 RX ORDER — ARIPIPRAZOLE 5 MG/1
5 TABLET ORAL DAILY
COMMUNITY

## 2023-01-31 RX ORDER — COVID-19 ANTIGEN TEST
660 KIT MISCELLANEOUS DAILY
COMMUNITY